# Patient Record
Sex: FEMALE | Race: WHITE | NOT HISPANIC OR LATINO | URBAN - METROPOLITAN AREA
[De-identification: names, ages, dates, MRNs, and addresses within clinical notes are randomized per-mention and may not be internally consistent; named-entity substitution may affect disease eponyms.]

---

## 2019-06-10 ENCOUNTER — HOSPITAL ENCOUNTER (OUTPATIENT)
Facility: MEDICAL CENTER | Age: 49
End: 2019-06-10
Attending: PHYSICIAN ASSISTANT
Payer: COMMERCIAL

## 2019-06-10 ENCOUNTER — OFFICE VISIT (OUTPATIENT)
Dept: URGENT CARE | Facility: MEDICAL CENTER | Age: 49
End: 2019-06-10
Payer: COMMERCIAL

## 2019-06-10 VITALS
HEART RATE: 95 BPM | SYSTOLIC BLOOD PRESSURE: 124 MMHG | TEMPERATURE: 97.4 F | WEIGHT: 190 LBS | DIASTOLIC BLOOD PRESSURE: 70 MMHG | BODY MASS INDEX: 32.44 KG/M2 | RESPIRATION RATE: 16 BRPM | OXYGEN SATURATION: 99 % | HEIGHT: 64 IN

## 2019-06-10 DIAGNOSIS — N92.1 MENORRHAGIA WITH IRREGULAR CYCLE: ICD-10-CM

## 2019-06-10 DIAGNOSIS — N89.8 VAGINAL ODOR: ICD-10-CM

## 2019-06-10 LAB
APPEARANCE UR: NORMAL
BILIRUB UR STRIP-MCNC: NORMAL MG/DL
COLOR UR AUTO: YELLOW
GLUCOSE UR STRIP.AUTO-MCNC: NORMAL MG/DL
KETONES UR STRIP.AUTO-MCNC: NORMAL MG/DL
LEUKOCYTE ESTERASE UR QL STRIP.AUTO: NORMAL
NITRITE UR QL STRIP.AUTO: NORMAL
PH UR STRIP.AUTO: 5.5 [PH] (ref 5–8)
PROT UR QL STRIP: NORMAL MG/DL
RBC UR QL AUTO: NORMAL
SP GR UR STRIP.AUTO: 1.01
UROBILINOGEN UR STRIP-MCNC: 0.2 MG/DL

## 2019-06-10 PROCEDURE — 87510 GARDNER VAG DNA DIR PROBE: CPT

## 2019-06-10 PROCEDURE — 87660 TRICHOMONAS VAGIN DIR PROBE: CPT

## 2019-06-10 PROCEDURE — 87480 CANDIDA DNA DIR PROBE: CPT

## 2019-06-10 PROCEDURE — 81002 URINALYSIS NONAUTO W/O SCOPE: CPT | Performed by: PHYSICIAN ASSISTANT

## 2019-06-10 PROCEDURE — 99213 OFFICE O/P EST LOW 20 MIN: CPT | Performed by: PHYSICIAN ASSISTANT

## 2019-06-10 ASSESSMENT — ENCOUNTER SYMPTOMS
PALPITATIONS: 0
BRUISES/BLEEDS EASILY: 0
ABDOMINAL PAIN: 1
SHORTNESS OF BREATH: 0
CONSTIPATION: 0
NAUSEA: 0
DIARRHEA: 0
FEVER: 0
BACK PAIN: 1
FLANK PAIN: 0
DIZZINESS: 0
SORE THROAT: 0
BLURRED VISION: 0
ANOREXIA: 0
COUGH: 0
EYE PAIN: 0
CHILLS: 0

## 2019-06-10 NOTE — PROGRESS NOTES
Subjective:      Corazon Morin is a 49 y.o. female who presents with Menstrual Problem (bleeding, hasnt had period in 6 months, then cramping and heavy bleeding starts, slowed down now but still having cramps and back pain, )      Menstrual Problem   The patient's primary symptoms include a genital odor and vaginal bleeding. This is a new problem. The current episode started in the past 7 days. The problem occurs constantly. The problem has been gradually improving. The pain is mild. She is not pregnant. Associated symptoms include abdominal pain (Crampy abdominal pain) and back pain. Pertinent negatives include no anorexia, chills, constipation, diarrhea, dysuria, fever, flank pain, frequency, joint pain, nausea, rash, sore throat or urgency. The vaginal discharge was bloody and copious. The vaginal bleeding is heavier than menses. She has not been passing clots. She has not been passing tissue. Nothing aggravates the symptoms. She has tried nothing for the symptoms. She is not sexually active. She uses nothing for contraception. Her menstrual history has been irregular (Patient reports that prior to onset of her menstrual cycle 4 days ago it had been 6 months since her last cycle).       Review of Systems   Constitutional: Negative for chills, fever and malaise/fatigue.   HENT: Negative for congestion and sore throat.    Eyes: Negative for blurred vision and pain.   Respiratory: Negative for cough and shortness of breath.    Cardiovascular: Negative for chest pain and palpitations.   Gastrointestinal: Positive for abdominal pain (Crampy abdominal pain). Negative for anorexia, constipation, diarrhea and nausea.   Genitourinary: Positive for menstrual problem. Negative for dysuria, flank pain, frequency and urgency.        Heavy menstrual bleeding and abnormal vaginal odor   Musculoskeletal: Positive for back pain. Negative for joint pain.   Skin: Negative for rash.   Neurological: Negative for dizziness.  "  Endo/Heme/Allergies: Does not bruise/bleed easily.       PMH:  has no past medical history on file.  MEDS:   Current Outpatient Prescriptions:   •  hydrocodone-acetaminophen (NORCO) 5-325 MG Tab per tablet, 1-2 tablets at bedtime prn moderate to severe pain.  No driving., Disp: 10 Tab, Rfl: 0  ALLERGIES: No Known Allergies  SURGHX: History reviewed. No pertinent surgical history.  SOCHX:  reports that she has been smoking Cigarettes.  She has never used smokeless tobacco. She reports that she drinks alcohol.  FH: Family history was reviewed, no pertinent findings to report     Objective:     /70   Pulse 95   Temp 36.3 °C (97.4 °F)   Resp 16   Ht 1.626 m (5' 4\")   Wt 86.2 kg (190 lb)   SpO2 99%   BMI 32.61 kg/m²      Physical Exam   Constitutional: She is oriented to person, place, and time. She appears well-developed and well-nourished.   HENT:   Head: Normocephalic and atraumatic.   Right Ear: External ear normal.   Left Ear: External ear normal.   Eyes: Pupils are equal, round, and reactive to light. Conjunctivae are normal.   Cardiovascular: Normal rate and regular rhythm.    No murmur heard.  Pulmonary/Chest: Effort normal and breath sounds normal.   Abdominal: Soft. Normal appearance. There is tenderness in the right lower quadrant, suprapubic area and left lower quadrant. There is no CVA tenderness.   Genitourinary: Pelvic exam was performed with patient supine. Cervix exhibits no motion tenderness. Right adnexum displays tenderness. Right adnexum displays no mass and no fullness. There is bleeding in the vagina.   Neurological: She is alert and oriented to person, place, and time.   Skin: Skin is warm and dry. Capillary refill takes less than 2 seconds.   Psychiatric: She has a normal mood and affect. Her behavior is normal. Judgment normal.          Assessment/Plan:     1. Menorrhagia with irregular cycle  - REFERRAL TO GYNECOLOGY    2. Vaginal odor  - VAGINAL PATHOGENS DNA PANEL; Future  - " POCT Urinalysis        Differential Diagnosis, natural history, and supportive care discussed. Return to the Urgent Care or follow up with your PCP if symptoms fail to resolve, or for any new or worsening symptoms. Emergency room precautions discussed. Patient and/or family appears understanding of information.

## 2019-06-11 LAB
CANDIDA DNA VAG QL PROBE+SIG AMP: NEGATIVE
G VAGINALIS DNA VAG QL PROBE+SIG AMP: NEGATIVE
T VAGINALIS DNA VAG QL PROBE+SIG AMP: NEGATIVE

## 2019-06-13 ENCOUNTER — TELEPHONE (OUTPATIENT)
Dept: URGENT CARE | Facility: CLINIC | Age: 49
End: 2019-06-13

## 2019-06-13 NOTE — TELEPHONE ENCOUNTER
Call patient notified her of her negative vaginal pathogens DNA panel results.  Patient informed that she has an appointment set up for July to see the gynecologist.

## 2019-07-17 ENCOUNTER — HOSPITAL ENCOUNTER (OUTPATIENT)
Facility: MEDICAL CENTER | Age: 49
End: 2019-07-17
Attending: OBSTETRICS & GYNECOLOGY
Payer: COMMERCIAL

## 2019-07-17 ENCOUNTER — GYNECOLOGY VISIT (OUTPATIENT)
Dept: OBGYN | Facility: CLINIC | Age: 49
End: 2019-07-17
Payer: COMMERCIAL

## 2019-07-17 VITALS — WEIGHT: 221 LBS | SYSTOLIC BLOOD PRESSURE: 118 MMHG | DIASTOLIC BLOOD PRESSURE: 78 MMHG | BODY MASS INDEX: 37.93 KG/M2

## 2019-07-17 DIAGNOSIS — Z12.4 CERVICAL CANCER SCREENING: ICD-10-CM

## 2019-07-17 DIAGNOSIS — R10.2 PELVIC PAIN IN FEMALE: ICD-10-CM

## 2019-07-17 DIAGNOSIS — N95.1 PERIMENOPAUSAL: ICD-10-CM

## 2019-07-17 DIAGNOSIS — N93.9 ABNORMAL UTERINE BLEEDING (AUB): ICD-10-CM

## 2019-07-17 DIAGNOSIS — Z12.39 BREAST CANCER SCREENING: ICD-10-CM

## 2019-07-17 PROCEDURE — 99204 OFFICE O/P NEW MOD 45 MIN: CPT | Performed by: OBSTETRICS & GYNECOLOGY

## 2019-07-17 PROCEDURE — 87591 N.GONORRHOEAE DNA AMP PROB: CPT

## 2019-07-17 PROCEDURE — 87624 HPV HI-RISK TYP POOLED RSLT: CPT

## 2019-07-17 PROCEDURE — 87491 CHLMYD TRACH DNA AMP PROBE: CPT

## 2019-07-17 PROCEDURE — 88175 CYTOPATH C/V AUTO FLUID REDO: CPT

## 2019-07-17 RX ORDER — MEDROXYPROGESTERONE ACETATE 10 MG/1
10 TABLET ORAL DAILY
Qty: 10 TAB | Refills: 0 | Status: SHIPPED | OUTPATIENT
Start: 2019-07-17 | End: 2020-03-03

## 2019-07-17 NOTE — PROGRESS NOTES
Subjective:      Corazon Morin is a 49 y.o. female who presents as New Patient            HPI patient is a 49-year-old  3 para 2-0-1-2 who presents today for evaluation of abnormal uterine bleeding pain and pelvic pain/low back pain.    Patient states she has not had any menstrual bleeding for about 6 months then all of a sudden she started having very heavy bleeding with lower pelvic cramping and low back pain.  Patient states bleeding has slowed down significantly.  Patient states she went to the urgent care but was not seen because she left after waiting for a few hours.    Patient states she has not had a Pap smear in 30 years.  She denies any history of PID or STDs.  She has never had a mammogram.  She does not do self breast exams.  Patient states she does not go for routine care and exams because she does not like going to the doctors.  She reports normal bowel and bladder functions.  She reports some intermittent hot flashes and mood swings but symptoms are mild and have been going on for about a year    Patient smokes intermittently and uses alcohol on occasion.  Denies drug use    Patient has had history of 2 vaginal deliveries.      ROS all organ systems were reviewed and were negative except for complaints in HPI       Objective:     /78   Wt 100.2 kg (221 lb)   BMI 37.93 kg/m²      Physical Exam   Constitutional: She is oriented to person, place, and time. She appears well-developed and well-nourished. No distress.   HENT:   Head: Normocephalic and atraumatic.   Eyes: Pupils are equal, round, and reactive to light. EOM are normal. Right eye exhibits no discharge. Left eye exhibits no discharge.   Neck: Normal range of motion. Neck supple. No tracheal deviation present. No thyromegaly present.   Cardiovascular: Normal rate, regular rhythm, normal heart sounds and intact distal pulses.    Pulmonary/Chest: Effort normal and breath sounds normal. No respiratory distress. Right breast  exhibits no inverted nipple, no mass, no nipple discharge, no skin change and no tenderness. Left breast exhibits no inverted nipple, no mass, no nipple discharge, no skin change and no tenderness.   Abdominal: Soft. Bowel sounds are normal. She exhibits no distension. There is no tenderness. There is no guarding.   Genitourinary: There is no rash, tenderness, lesion or injury on the right labia. There is no rash, tenderness, lesion or injury on the left labia. Uterus is enlarged and tender. Cervix exhibits no motion tenderness, no discharge and no friability. Right adnexum displays no mass, no tenderness and no fullness. Left adnexum displays no mass, no tenderness and no fullness. There is bleeding in the vagina. No tenderness in the vagina.   Genitourinary Comments: Uterus is mildly enlarged and tender.  Cervix was without any masses or lesions   Musculoskeletal: Normal range of motion. She exhibits no edema or deformity.   Neurological: She is alert and oriented to person, place, and time. No cranial nerve deficit. Coordination normal.   Skin: Skin is warm and dry. She is not diaphoretic. No erythema.   Psychiatric: She has a normal mood and affect. Her behavior is normal. Judgment and thought content normal.   Nursing note and vitals reviewed.       Discussion:    I discussed abnormal uterine bleeding and symptoms.  I discussed she is likely perimenopausal.  Differential diagnosis of abnormal uterine bleeding hormonal change, hyperplasia, malignancy and uterine masses.  I discussed evaluation of abnormal uterine bleeding and need for endometrial sampling.  I discussed endometrial sampling in office versus under anesthesia and she would like to have office sampling if possible.  We will follow-up after pelvic ultrasound to discuss findings and how to proceed.  We discussed temporary treatment of abnormal bleeding with Provera and patient agrees for usage.       Assessment/Plan:     1. Abnormal uterine bleeding  (AUB)  Patient counseled on the possible etiology for abnormal bleeding and evaluation.  Pelvic ultrasound ordered  Patient will need endometrial sampling after ultrasound.  She will follow-up for evaluation after ultrasound  - US-PELVIC COMPLETE (TRANSABDOMINAL/TRANSVAGINAL) (COMBO); Future    2. Pelvic pain in female  Etiology for pain reviewed and treatment discussed.  Patient encouraged to try ibuprofen and heat  - US-PELVIC COMPLETE (TRANSABDOMINAL/TRANSVAGINAL) (COMBO); Future    3. Perimenopausal  Patient is likely going to perimenopausal transition based on symptoms  - US-PELVIC COMPLETE (TRANSABDOMINAL/TRANSVAGINAL) (COMBO); Future    4. Cervical cancer screening  Pap smear obtained today.  Screening recommendations discussed    5. Breast cancer screening  Mammogram ordered.  Breast cancer screening recommendations reviewed including recommendation for self breast exams and annual clinical breast exams.  Mammogram recommendations reviewed  - MA-SCREENING MAMMO BILAT W/TOMOSYNTHESIS W/CAD; Future    Precautions and plan of care reviewed    Patient to follow-up after ultrasound.

## 2019-07-19 LAB
C TRACH DNA GENITAL QL NAA+PROBE: NEGATIVE
CYTOLOGY REG CYTOL: NORMAL
HPV HR 12 DNA CVX QL NAA+PROBE: NEGATIVE
HPV16 DNA SPEC QL NAA+PROBE: NEGATIVE
HPV18 DNA SPEC QL NAA+PROBE: NEGATIVE
N GONORRHOEA DNA GENITAL QL NAA+PROBE: NEGATIVE
SPECIMEN SOURCE: NORMAL
SPECIMEN SOURCE: NORMAL

## 2019-07-27 DIAGNOSIS — N93.9 ABNORMAL UTERINE BLEEDING (AUB): ICD-10-CM

## 2019-07-29 ENCOUNTER — HOSPITAL ENCOUNTER (OUTPATIENT)
Dept: RADIOLOGY | Facility: MEDICAL CENTER | Age: 49
End: 2019-07-29
Attending: OBSTETRICS & GYNECOLOGY
Payer: COMMERCIAL

## 2019-07-29 DIAGNOSIS — R10.2 PELVIC PAIN IN FEMALE: ICD-10-CM

## 2019-07-29 DIAGNOSIS — N95.1 PERIMENOPAUSAL: ICD-10-CM

## 2019-07-29 DIAGNOSIS — N93.9 ABNORMAL UTERINE BLEEDING (AUB): ICD-10-CM

## 2019-07-29 PROCEDURE — 76830 TRANSVAGINAL US NON-OB: CPT

## 2019-08-02 ENCOUNTER — TELEPHONE (OUTPATIENT)
Dept: SCHEDULING | Facility: IMAGING CENTER | Age: 49
End: 2019-08-02

## 2019-08-05 ENCOUNTER — TELEPHONE (OUTPATIENT)
Dept: OBGYN | Facility: MEDICAL CENTER | Age: 49
End: 2019-08-05

## 2019-08-05 NOTE — TELEPHONE ENCOUNTER
----- Message from Dory Colindres M.D. sent at 7/27/2019  5:33 PM PDT -----  Please call patient and inform her there were some abnormal cells on her pap smear.  Referral placed for embx.  Please schedule.    8/5/19 1104 Pt notified of above, explained procedure to patient and adv her she will received a call to schedule appt. Pt voiced understanding and had no questions

## 2019-08-09 ENCOUNTER — HOSPITAL ENCOUNTER (OUTPATIENT)
Facility: MEDICAL CENTER | Age: 49
End: 2019-08-09
Attending: OBSTETRICS & GYNECOLOGY
Payer: COMMERCIAL

## 2019-08-09 ENCOUNTER — GYNECOLOGY VISIT (OUTPATIENT)
Dept: OBGYN | Facility: MEDICAL CENTER | Age: 49
End: 2019-08-09
Payer: COMMERCIAL

## 2019-08-09 VITALS — BODY MASS INDEX: 38.62 KG/M2 | SYSTOLIC BLOOD PRESSURE: 112 MMHG | WEIGHT: 225 LBS | DIASTOLIC BLOOD PRESSURE: 68 MMHG

## 2019-08-09 DIAGNOSIS — N93.9 ABNORMAL UTERINE BLEEDING (AUB): ICD-10-CM

## 2019-08-09 LAB — PATHOLOGY CONSULT NOTE: NORMAL

## 2019-08-09 PROCEDURE — 58100 BIOPSY OF UTERUS LINING: CPT | Performed by: OBSTETRICS & GYNECOLOGY

## 2019-08-09 PROCEDURE — 88305 TISSUE EXAM BY PATHOLOGIST: CPT

## 2019-08-09 NOTE — PROGRESS NOTES
The indications for endometrial biopsy were discussed with patient. The risks associated with biopsy were discussed to include bleeding, infection, pain, failure to obtain adequate tissue for diagnosis, need for repeat procedure.  Informed consent was obtained.  Urine pregnancy test was performed and was negative  The patient was placed in dorsal lithotomy position a speculum was inserted into the vagina for visualization of the cervix.  The cervix was evaluated and noted to be normal, then prepped and cleansed with Betadine swabs X 3.  The anterior lip of the cervix was grasped with the single tooth tenaculum.  The uterus was sounded and noted to be 9 cm  The endometrial biopsy pipelle was passed through the cervical os into the endometrial cavity x 3  Good amount of sample is obtained  All instruments were removed from the vagina and cervix, and  hemostasis was noted.  The patient tolerated the procedure well.    F/U 2 weeks to review results and discuss further management.

## 2019-08-23 ENCOUNTER — GYNECOLOGY VISIT (OUTPATIENT)
Dept: OBGYN | Facility: MEDICAL CENTER | Age: 49
End: 2019-08-23
Payer: COMMERCIAL

## 2019-08-23 VITALS — BODY MASS INDEX: 37.93 KG/M2 | WEIGHT: 221 LBS | DIASTOLIC BLOOD PRESSURE: 78 MMHG | SYSTOLIC BLOOD PRESSURE: 120 MMHG

## 2019-08-23 DIAGNOSIS — N92.4 ABNORMAL PERIMENOPAUSAL BLEEDING: ICD-10-CM

## 2019-08-23 PROCEDURE — 99213 OFFICE O/P EST LOW 20 MIN: CPT | Performed by: OBSTETRICS & GYNECOLOGY

## 2019-08-23 NOTE — PROGRESS NOTES
S: 49-year-old  presents for follow-up of abnormal uterine bleeding.  The bleeding has stopped.  She states she took 3 days of progesterone.  She had no bleeding for 6 months, and in  had a very heavy period associated with cramping.  She complains of occasional hot flashes.  She denies mood swings or vaginal dryness.  She continues to smoke occasionally.    O:/78 (BP Location: Left arm, Patient Position: Sitting)   Wt 100.2 kg (221 lb)      19 -pelvic ultrasound  Uterus measures 5.3 x 9.6 x 6.1 centimeters.  A 2.5 cm dorsal fibroid is present.  Endometrial echo measures 0.27 cm.  Ovaries were not visualized.      2019 -endometrial biopsy results  Weakly proliferative endometrium with blood and mucus.  No hyperplasia, atypia, or malignancy identified.    Pap smear from 2019  Negative for intraepithelial lesion  Endometrial cells present  HPV negative    A/P: Perimenopausal bleeding -no evidence of endometrial hyperplasia.  There is a small fibroid, but would recommend observation.  If further bleeding, will consider treatment with progesterone.  Patient urged to complete her mammogram as previously ordered.    F/U 2 months

## 2020-02-18 ENCOUNTER — TELEPHONE (OUTPATIENT)
Dept: SCHEDULING | Facility: IMAGING CENTER | Age: 50
End: 2020-02-18

## 2020-03-03 ENCOUNTER — OFFICE VISIT (OUTPATIENT)
Dept: MEDICAL GROUP | Age: 50
End: 2020-03-03
Payer: COMMERCIAL

## 2020-03-03 VITALS
WEIGHT: 207 LBS | HEART RATE: 80 BPM | TEMPERATURE: 98 F | SYSTOLIC BLOOD PRESSURE: 126 MMHG | BODY MASS INDEX: 38.09 KG/M2 | HEIGHT: 62 IN | DIASTOLIC BLOOD PRESSURE: 84 MMHG | OXYGEN SATURATION: 99 %

## 2020-03-03 DIAGNOSIS — Z00.00 ENCOUNTER FOR MEDICAL EXAMINATION TO ESTABLISH CARE: ICD-10-CM

## 2020-03-03 DIAGNOSIS — Z12.11 SCREENING FOR COLON CANCER: ICD-10-CM

## 2020-03-03 DIAGNOSIS — N93.9 ABNORMAL UTERINE BLEEDING (AUB): ICD-10-CM

## 2020-03-03 DIAGNOSIS — Z00.00 ANNUAL PHYSICAL EXAM: ICD-10-CM

## 2020-03-03 PROCEDURE — 99204 OFFICE O/P NEW MOD 45 MIN: CPT | Performed by: FAMILY MEDICINE

## 2020-03-03 ASSESSMENT — PATIENT HEALTH QUESTIONNAIRE - PHQ9: CLINICAL INTERPRETATION OF PHQ2 SCORE: 0

## 2020-03-03 NOTE — PROGRESS NOTES
This medical record contains text that has been entered with the assistance of computer voice recognition and dictation software. Therefore, it may contain unintended errors in text, spelling, punctuation or grammar.    Chief Complaint   Patient presents with   • Establish Care       HPI:   Corazon Morin is a 50 y.o. female here evaluation and management of:     Encounter for medical examination to establish care  Annual physical exam    The patient presents today to establish care. She has not had a primary care provider for several years.    Today, the patient denied any recent illness, cough, chest pain, shortness of breath, abdominal pain, bowel changes or urinary symptoms.     Past medical history--- Patient denies any major medical history including diabetes, hypertension, hypercholesterolemia, thyroid disease or cardiac disease.    Family History of Cancer--- Patient denies a family history of cancer.    Family History of CAD--- Patient denies a family history of cardiac disease.      Social History  Occupation---- Patient is a  for the [employer] in [employer].     Exercise--- Patient attends the gym three times a week. She includes weight lifting and cardio into her routine.    Colonoscopy--- N/A    Screening for colon cancer    Due for colon cancer screening. She is asymptomatic with no personal or family history of colon cancer.     Abnormal uterine bleeding (AUB)    Patient is followed by Dr. Colindres, Gynecology, for heavy vaginal bleeding. She was treated with three days of progesterone which stopped her bleeding for six months. She began to experience recurrent heavy bleeding in June 2019. Additionally, she complains of cramping and hot flashes. Negative for mood swings or vaginal dryness.     7/29/19 -pelvic ultrasound  Uterus measures 5.3 x 9.6 x 6.1 centimeters.  A 2.5 cm dorsal fibroid is present.  Endometrial echo measures 0.27 cm.  Ovaries were not visualized.        8/9/2019 -endometrial biopsy  "results  Weakly proliferative endometrium with blood and mucus.  No hyperplasia, atypia, or malignancy identified.     Pap smear from 2019  Negative for intraepithelial lesion  Endometrial cells present  HPV negative       Current medicines (including changes today)  No current outpatient medications on file.     No current facility-administered medications for this visit.      She  has a past medical history of Sinusitis.  She  has no past surgical history on file.  Social History     Tobacco Use   • Smoking status: Current Some Day Smoker     Types: Cigarettes   • Smokeless tobacco: Never Used   Substance Use Topics   • Alcohol use: Yes     Comment: occ   • Drug use: Not Currently     Social History     Social History Narrative   • Not on file     Family History   Problem Relation Age of Onset   • No Known Problems Mother    • Heart Disease Father    • No Known Problems Brother    • No Known Problems Brother      Family Status   Relation Name Status   • Mo  Alive   • Fa     • Bro  Alive   • Bro  Alive       ROS    Please see HPI for further details.     All other systems reviewed and are negative.     Objective:     /84 (BP Location: Right arm, Patient Position: Sitting, BP Cuff Size: Adult)   Pulse 80   Temp 36.7 °C (98 °F) (Temporal)   Ht 1.575 m (5' 2\")   Wt 93.9 kg (207 lb)   SpO2 99%  Body mass index is 37.86 kg/m².    Physical Exam:    Constitutional: Alert, no distress.  Skin: Warm, dry, good turgor, no rashes in visible areas.  Eye: Equal, round and reactive, conjunctiva clear, lids normal.  ENMT: Lips without lesions, good dentition, oropharynx clear.  Neck: Trachea midline, no masses, no thyromegaly. No cervical or supraclavicular lymphadenopathy.  Respiratory: Unlabored respiratory effort, lungs clear to auscultation, no wheezes, no ronchi.  Cardiovascular: Normal S1, S2, no murmur, no edema.  Psych: Alert and oriented x3, normal affect and mood.      Assessment and Plan:   The " following treatment plan was discussed:    1. Encounter for medical examination to establish care    Patient presents today to establish care. Records were requested.    This is a healthy 50 year old female here today for a preventative exam.  Previous medical history, healthcare maintenance and immunization status reviewed.  Patient is up to date. Annual labwork ordered. See discussion of anticipatory guidance below. Patient will return annually for preventative exams.    - Comp Metabolic Panel; Future  - CBC WITHOUT DIFFERENTIAL; Future  - Lipid Profile; Future  - TSH+FREE T4  - T3 FREE; Future    3. Screening for colon cancer    Patient is due for colon cancer screening. She was provided a referral to GI for a colonoscopy. She is asymptomatic with no personal or family history of colon cancer.     - REFERRAL TO GASTROENTEROLOGY     4. Abnormal uterine bleeding (AUB)    Followed by Gynecology. Recommended the patient return to Dr. Colindres to discuss a possible hysterectomy as the vaginal bleeding remains recurrent and heavy.      HEALTH MAINTENANCE: Patient is due for the pnuemovax, shingrix and influenza vaccines which she declined today.        -- Follow up in six months with routine labs. Instructed to follow up in clinic or ER for worsening symptoms, difficulty breathing, lack of expected recovery or should new symptoms or problems arise.        Once again this medical record contains text that has been entered with the assistance of computer voice recognition, dictation software and medical scribes. Therefore, it may contain unintended errors in text, spelling, punctuation or grammar.    Xiomara PARRISH (Scribe), am scribing for, and in the presence of, Bjorn Jeffery M.D.    Electronically signed by: Xiomara King (Scribe), 3/3/2020     IBjorn M.D. personally performed the services described in this documentation, as scribed by Xiomara King in my presence, and it is both accurate and  complete.

## 2020-03-04 ENCOUNTER — HOSPITAL ENCOUNTER (OUTPATIENT)
Dept: LAB | Facility: MEDICAL CENTER | Age: 50
End: 2020-03-04
Attending: FAMILY MEDICINE
Payer: COMMERCIAL

## 2020-03-04 DIAGNOSIS — Z00.00 ANNUAL PHYSICAL EXAM: ICD-10-CM

## 2020-03-04 LAB
ALBUMIN SERPL BCP-MCNC: 3.8 G/DL (ref 3.2–4.9)
ALBUMIN/GLOB SERPL: 1.3 G/DL
ALP SERPL-CCNC: 60 U/L (ref 30–99)
ALT SERPL-CCNC: 24 U/L (ref 2–50)
ANION GAP SERPL CALC-SCNC: 8 MMOL/L (ref 0–11.9)
AST SERPL-CCNC: 20 U/L (ref 12–45)
BILIRUB SERPL-MCNC: 0.4 MG/DL (ref 0.1–1.5)
BUN SERPL-MCNC: 15 MG/DL (ref 8–22)
CALCIUM SERPL-MCNC: 8.5 MG/DL (ref 8.5–10.5)
CHLORIDE SERPL-SCNC: 106 MMOL/L (ref 96–112)
CHOLEST SERPL-MCNC: 175 MG/DL (ref 100–199)
CO2 SERPL-SCNC: 26 MMOL/L (ref 20–33)
CREAT SERPL-MCNC: 0.83 MG/DL (ref 0.5–1.4)
ERYTHROCYTE [DISTWIDTH] IN BLOOD BY AUTOMATED COUNT: 46.1 FL (ref 35.9–50)
FASTING STATUS PATIENT QL REPORTED: NORMAL
GLOBULIN SER CALC-MCNC: 3 G/DL (ref 1.9–3.5)
GLUCOSE SERPL-MCNC: 93 MG/DL (ref 65–99)
HCT VFR BLD AUTO: 48.9 % (ref 37–47)
HDLC SERPL-MCNC: 69 MG/DL
HGB BLD-MCNC: 16.1 G/DL (ref 12–16)
LDLC SERPL CALC-MCNC: 90 MG/DL
MCH RBC QN AUTO: 32 PG (ref 27–33)
MCHC RBC AUTO-ENTMCNC: 32.9 G/DL (ref 33.6–35)
MCV RBC AUTO: 97.2 FL (ref 81.4–97.8)
PLATELET # BLD AUTO: 345 K/UL (ref 164–446)
PMV BLD AUTO: 10.1 FL (ref 9–12.9)
POTASSIUM SERPL-SCNC: 4.5 MMOL/L (ref 3.6–5.5)
PROT SERPL-MCNC: 6.8 G/DL (ref 6–8.2)
RBC # BLD AUTO: 5.03 M/UL (ref 4.2–5.4)
SODIUM SERPL-SCNC: 140 MMOL/L (ref 135–145)
T3FREE SERPL-MCNC: 2.91 PG/ML (ref 2.4–4.2)
T4 FREE SERPL-MCNC: 0.69 NG/DL (ref 0.53–1.43)
TRIGL SERPL-MCNC: 78 MG/DL (ref 0–149)
TSH SERPL DL<=0.005 MIU/L-ACNC: 4.35 UIU/ML (ref 0.38–5.33)
WBC # BLD AUTO: 9.7 K/UL (ref 4.8–10.8)

## 2020-03-04 PROCEDURE — 80061 LIPID PANEL: CPT

## 2020-03-04 PROCEDURE — 84443 ASSAY THYROID STIM HORMONE: CPT

## 2020-03-04 PROCEDURE — 36415 COLL VENOUS BLD VENIPUNCTURE: CPT

## 2020-03-04 PROCEDURE — 84481 FREE ASSAY (FT-3): CPT

## 2020-03-04 PROCEDURE — 85027 COMPLETE CBC AUTOMATED: CPT

## 2020-03-04 PROCEDURE — 84439 ASSAY OF FREE THYROXINE: CPT

## 2020-03-04 PROCEDURE — 80053 COMPREHEN METABOLIC PANEL: CPT
